# Patient Record
Sex: FEMALE | Race: WHITE | NOT HISPANIC OR LATINO | Employment: STUDENT | URBAN - NONMETROPOLITAN AREA
[De-identification: names, ages, dates, MRNs, and addresses within clinical notes are randomized per-mention and may not be internally consistent; named-entity substitution may affect disease eponyms.]

---
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
## 2022-01-06 RX ORDER — LURASIDONE HYDROCHLORIDE 60 MG/1
60 TABLET, FILM COATED ORAL DAILY
COMMUNITY

## 2022-01-06 RX ORDER — LITHIUM CARBONATE 150 MG/1
2 CAPSULE ORAL EVERY 8 HOURS
COMMUNITY

## 2022-01-06 RX ORDER — ESCITALOPRAM OXALATE 10 MG/1
20 TABLET ORAL DAILY
COMMUNITY
Start: 2020-02-04

## 2022-01-06 RX ORDER — NORETHINDRONE ACETATE AND ETHINYL ESTRADIOL 1MG-20(21)
1 KIT ORAL DAILY
COMMUNITY
Start: 2020-02-04 | End: 2022-02-10

## 2022-01-06 RX ORDER — PROPRANOLOL HYDROCHLORIDE 20 MG/1
20 TABLET ORAL EVERY 12 HOURS
COMMUNITY

## 2022-02-10 ENCOUNTER — OFFICE VISIT (OUTPATIENT)
Dept: BARIATRICS/WEIGHT MGMT | Facility: CLINIC | Age: 22
End: 2022-02-10

## 2022-02-10 ENCOUNTER — DOCUMENTATION (OUTPATIENT)
Dept: BARIATRICS/WEIGHT MGMT | Facility: CLINIC | Age: 22
End: 2022-02-10

## 2022-02-10 VITALS
OXYGEN SATURATION: 98 % | HEART RATE: 107 BPM | DIASTOLIC BLOOD PRESSURE: 68 MMHG | WEIGHT: 287.5 LBS | BODY MASS INDEX: 46.21 KG/M2 | SYSTOLIC BLOOD PRESSURE: 118 MMHG | RESPIRATION RATE: 18 BRPM | TEMPERATURE: 96.8 F | HEIGHT: 66 IN

## 2022-02-10 DIAGNOSIS — E28.2 PCOS (POLYCYSTIC OVARIAN SYNDROME): ICD-10-CM

## 2022-02-10 DIAGNOSIS — R13.10 DYSPHAGIA, UNSPECIFIED TYPE: ICD-10-CM

## 2022-02-10 DIAGNOSIS — E66.01 MORBID OBESITY: Primary | ICD-10-CM

## 2022-02-10 DIAGNOSIS — R53.83 FATIGUE, UNSPECIFIED TYPE: ICD-10-CM

## 2022-02-10 DIAGNOSIS — R10.13 DYSPEPSIA: ICD-10-CM

## 2022-02-10 PROCEDURE — 99204 OFFICE O/P NEW MOD 45 MIN: CPT | Performed by: PHYSICIAN ASSISTANT

## 2022-02-10 NOTE — PROGRESS NOTES
[facility] GROUP BARIATRIC SURGERY  [address]  [address]  [facility] [id number] [phone]      Patient  Name:  Heather Damico  :  2000      Date of Visit: 02/10/2022      Chief Complaint:  weight gain; unable to maintain weight loss      History of Present Illness:  Heather Damico is a 21 y.o. female who presents today for evaluation, education and consultation regarding metabolic and bariatric surgery with Dr. Kraft.    s/p RIO w/ Dr. gAuilar 2020.  Has struggled w/ wt.loss.  Band vol unknown but says adjustments have been difficult d/t port positioning issues.      Presurgery weight:  310 lbs.  Lowest weight:  279 lbs.  Current weight:  287 lbs.      Has episodic dysphagia, worse w/ breads.  Occ port pain but denies redness/irritation.  No reflux/heartburn/N/V.  No fevers/chills.    Feels like she needs to go in a different direction.  Mom had an lapband that subsequently eroded, removed by Dr. Aguilar.  Dad had LSG w/ [facility].     Complete history has been obtained and discussed today, as pertinent to metabolic/ bariatric surgery.     Past Medical History:   Diagnosis Date   • Anxiety    • Bipolar disorder (HCC)     follows w/ therapist 2x/month   • Depression    • Dyspepsia    • Dyspnea on exertion    • Fatigue    • Morbid obesity (HCC)    • PCOS (polycystic ovarian syndrome)      Past Surgical History:   Procedure Laterality Date   • LAPAROSCOPIC GASTRIC BANDING      w/ Dr. Aguilar       No Known Allergies    Current Outpatient Medications:   •  escitalopram (Lexapro) 10 MG tablet, Take 20 mg by mouth Daily., Disp: , Rfl:   •  lithium carbonate 150 MG capsule, Take 2 capsules by mouth Every 8 (Eight) Hours., Disp: , Rfl:   •  lurasidone HCl (Latuda) 60 MG tablet tablet, Take 60 mg by mouth Daily., Disp: , Rfl:   •  propranolol (INDERAL) 20 MG tablet, Take 20 mg by mouth Every 12 (Twelve) Hours., Disp: , Rfl:   •  Sprintec 28 0.25-35 MG-MCG per tablet, , Disp: , Rfl:      Social History     Socioeconomic History   • Marital status: Single   Tobacco Use   • Smoking status: Never Smoker   • Smokeless tobacco: Never Used   Substance and Sexual Activity   • Alcohol use: Never   • Drug use: Never     Social History     Social History Narrative    Lives in [place], [place] w/ her parents/brother.  Student @ [employer].         Family History   Problem Relation Age of Onset   • Obesity Mother    • Obesity Father    • No Known Problems Maternal Grandmother    • No Known Problems Paternal Grandmother    • No Known Problems Paternal Grandfather        Review of Systems:  Constitutional:  reports fatigue, weight gain and denies fevers, chills.  HEENT:  denies headache, ear pain or loss of hearing, blurred or double vision, nasal discharge or sore throat.  Cardiovascular:  denies HTN, hx heart disease, palpitations, hx DVT.  Respiratory:  denies cough , wheezing, sleep apnea, asthma, hx PE.  Gastrointestinal:  denies nausea, vomiting, IBS, gallbladder issues, liver disease.  Genitourinary:  denies history of  frequent UTI, incontinence, hematuria, dysuria, polyuria, polydipsia, renal insufficiency.    Musculoskeletal:  denies joint pain, fibromyalgia, arthritis and autoimmune disease.  Neurological:  denies numbness /tingling, dizziness, confusion, seizure.  Psychiatric:  reports hx depression, hx anxiety, bipolar disorder.  Endocrine:  reports PCOS and denies glucose intolerance, diabetes, thyroid disease.  Hematologic:  denies bruising, bleeding disorder, hx anemia, hx blood transfusion.  Skin:  denies rashes, hx MRSA.      COVID-19 Questionnaire:    1.  Have you previously been tested for COVID-19?    []  No  [x]  Yes  2.  Were you ever positive for COVID-19?    [x]  No  []  Yes  3.  Are you employed in a healthcare setting?    [x]  No  []  Yes  4.  Are you symptomatic for COVID-19 as defined by the CDC (fever, cough)?  If so, when did symptoms begin?    [x]  No  []  Yes, symptoms began **  5.  Have  you been hospitalized for COVID-19?  If so, were you in the ICU?  [x]  No  []  Yes, but not in the ICU  []  Yes, and I was in the ICU  6.  Are you a resident in a congregate (group care setting?)    [x]  No  []  Yes  7.  Are you pregnant?  [x]  No  []  Yes      Physical Exam:  Vital Signs:  Weight: 130 kg (287 lb 8 oz)   Body mass index is 46.4 kg/m².  Temp: 96.8 °F (36 °C)   Heart Rate: 107   BP: 118/68     Physical Exam  Vitals reviewed.   Constitutional:       Appearance: She is well-developed.      Comments: wearing a mask   HENT:      Head: Normocephalic and atraumatic.   Eyes:      General: No scleral icterus.     Conjunctiva/sclera: Conjunctivae normal.   Neck:      Thyroid: No thyromegaly.   Cardiovascular:      Rate and Rhythm: Regular rhythm. Tachycardia present.      Heart sounds: No murmur heard.      Pulmonary:      Effort: Pulmonary effort is normal. No respiratory distress.      Breath sounds: Normal breath sounds. No wheezing or rales.   Abdominal:      General: Bowel sounds are normal. There is no distension.      Palpations: Abdomen is soft. There is no mass.      Tenderness: There is no abdominal tenderness.      Hernia: No hernia is present.      Comments: scars:  AGB w/ RUQ port - site unremarkable   Musculoskeletal:         General: Normal range of motion.      Cervical back: Neck supple.   Skin:     General: Skin is warm and dry.      Findings: No rash.   Neurological:      Mental Status: She is alert and oriented to person, place, and time.      Gait: Gait normal.   Psychiatric:         Judgment: Judgment normal.         Patient Active Problem List   Diagnosis   • Depression   • Anxiety   • Dyspepsia   • Dyspnea on exertion   • Fatigue   • Morbid obesity (HCC)   • Bipolar disorder (HCC)   • PCOS (polycystic ovarian syndrome)       Assessment:  21 y.o. female with medically complicated obesity pursuing sleeve gastrectomy.    Metabolic & Bariatric Surgery is deemed medically necessary given the  following: Patient's Body mass index is 46.4 kg/m². indicating that she is obese (BMI >30). Obesity-related health conditions include the following: PCOS. Obesity is worsening. BMI is is above average; BMI management plan is completed. We discussed consulting a Bariatric surgeon.        Plan:  Further evaluation will include: CBC, CMP, Lipids, TSH, HgA1C, H.Pylori UBT, EKG, CXR, UGI and EGD.    No additional clearances needed prior to surgery at this time.     Patient understands that bariatric surgery is not cosmetic surgery but rather a tool to help make a lifelong commitment to lifestyle changes including diet, exercise and behavior modifications.  The patient has been educated today on those expected postoperative lifestyle changes.  Psychological and Nutritional consultations will be completed prior to surgery.  Instructions on how to access [employer] (an internet based site w/ educational surgical videos) were given to the patient.  Recommended perioperative vitamin supplementation was reviewed.  The importance of avoiding ASA/ NSAIDS/ steroids/ tobacco/nicotine/ hormones/ immunomodulators perioperatively was discussed in detail.  All questions/concerns have been addressed.      Further input to follow pending the above.           VLAD Shore

## 2022-02-10 NOTE — PROGRESS NOTES
"Weight Loss Surgery  Presurgical Nutrition Assessment     Heather Damico  02/10/2022  [id number]  [id number]  2000  female    Surgery desired: Sleeve Gastrectomy - desires LSG after removal of AGB placed in 2020    Height: 167.6 cm (66\")  Weight: 130 kg (287.5 #)  BMI: 46.4    Past Medical History:   Diagnosis Date   • Anxiety    • Bipolar disorder (HCC)     follows w/ therapist 2x/month   • Depression    • Dyspepsia    • Dyspnea on exertion    • Fatigue    • Morbid obesity (HCC)    • PCOS (polycystic ovarian syndrome)      Past Surgical History:   Procedure Laterality Date   • LAPAROSCOPIC GASTRIC BANDING  2020    w/ Dr. Aguilar     No Known Allergies    Current Outpatient Medications:   •  escitalopram (Lexapro) 10 MG tablet, Take 20 mg by mouth Daily., Disp: , Rfl:   •  lithium carbonate 150 MG capsule, Take 2 capsules by mouth Every 8 (Eight) Hours., Disp: , Rfl:   •  lurasidone HCl (Latuda) 60 MG tablet tablet, Take 60 mg by mouth Daily., Disp: , Rfl:   •  propranolol (INDERAL) 20 MG tablet, Take 20 mg by mouth Every 12 (Twelve) Hours., Disp: , Rfl:   •  Sprintec 28 0.25-35 MG-MCG per tablet, , Disp: , Rfl:       Nutrition Assessment    Estimated energy needs:  2090 kcal    Estimated calories for weight loss:  1500 kcal    IBW (Pounds):  155 #        Excess body weight (Pounds):  135 #       Nutrition Recall  24 Hour recall: (B) (L) (D) -  Reviewed and discussed with patient.  Note that patient has gained ~55 # in the past 5 months, which she attributes to to dysphagia and depression during that time.  First eating episode at 1 pm = 1 banana & a 12 oz can of diet Coke.  Lunch @ 3 pm = 4 oz chicken tenders.  Dinner @ 5 pm = Chick-kasey-a chicken with 24 oz flavored water.  Snack @ 9:30 pm= 1 can chicken noodle soup /c a 12 oz can of diet soda.  Diet is deficient in protein and total calories and is devoid of fruits and vegetables.  Patient advised to focus on ingestion of adequate protein for successful weight " loss in 3 regular balanced meals + 2 to 3 high protein snacks per day.  She will wean self off of all diet soda and any other artificially or naturally sweetened beverages.       Exercise  never      Education    Provided information packet re:  Sleeve Gastrectomy  1. Reviewed guidelines for higher protein, limited carbohydrate diet to promote weight loss.  Encouraged patient to incorporate these principles of healthy eating from now until approximately 2 weeks prior to bariatric surgery date, when an even lower carbohydrate “liver-shrinking” regimen will be followed. (Information sheet re pre-op diet given).  Explained that after recovery from surgery this diet will again be followed to ensure further loss and for weight maintenance.    2. Encouraged patient to choose an acceptable protein supplement powder or shake for post-surgery liquid diet.  Provided product guidelines and examples.    3. Explained importance of goal setting to help in changing eating behaviors that are not conducive to weight loss.  Targeted several on a worksheet which also included spaces for patient to work on issues specific to them.  4. Provided follow-up options for support, including contact information for dietitians here, if desired.  Web-based support information and apps for smart phones and computers given.  Noted that monthly support group is offered at this clinic, and that support is associated with successful weight loss.    Recommend that team proceed with surgery and follow per protocol.      Nutrition Goals   Dietary Guidelines per information packet as described above  Protein goal:  grams per day   Carbohydrate goal:  100-140 grams per day  Eliminate soda, sweet tea, etc.     Exercise Goals  Continue current exercise routine   Add 15-30 minutes of activity per day as tolerated      Vane Reynoso, ELMA  02/10/2022  13:04 EST

## 2022-02-11 LAB
ALBUMIN SERPL-MCNC: 3.8 G/DL (ref 3.9–5)
ALBUMIN/GLOB SERPL: 1.1 {RATIO} (ref 1.2–2.2)
ALP SERPL-CCNC: 112 IU/L (ref 42–106)
ALT SERPL-CCNC: 14 IU/L (ref 0–32)
AST SERPL-CCNC: 18 IU/L (ref 0–40)
BASOPHILS # BLD AUTO: 0 X10E3/UL (ref 0–0.2)
BASOPHILS NFR BLD AUTO: 0 %
BILIRUB SERPL-MCNC: 0.2 MG/DL (ref 0–1.2)
BUN SERPL-MCNC: 7 MG/DL (ref 6–20)
BUN/CREAT SERPL: 9 (ref 9–23)
CALCIUM SERPL-MCNC: 9.8 MG/DL (ref 8.7–10.2)
CHLORIDE SERPL-SCNC: 102 MMOL/L (ref 96–106)
CHOLEST SERPL-MCNC: 165 MG/DL (ref 100–169)
CO2 SERPL-SCNC: 23 MMOL/L (ref 20–29)
CREAT SERPL-MCNC: 0.74 MG/DL (ref 0.57–1)
EOSINOPHIL # BLD AUTO: 0.1 X10E3/UL (ref 0–0.4)
EOSINOPHIL NFR BLD AUTO: 1 %
ERYTHROCYTE [DISTWIDTH] IN BLOOD BY AUTOMATED COUNT: 14.2 % (ref 11.7–15.4)
GLOBULIN SER CALC-MCNC: 3.5 G/DL (ref 1.5–4.5)
GLUCOSE SERPL-MCNC: 72 MG/DL (ref 65–99)
HBA1C MFR BLD: 5.2 % (ref 4.8–5.6)
HCT VFR BLD AUTO: 42.3 % (ref 34–46.6)
HDLC SERPL-MCNC: 42 MG/DL
HGB BLD-MCNC: 13.9 G/DL (ref 11.1–15.9)
IMM GRANULOCYTES # BLD AUTO: 0 X10E3/UL (ref 0–0.1)
IMM GRANULOCYTES NFR BLD AUTO: 0 %
LDLC SERPL CALC-MCNC: 87 MG/DL (ref 0–109)
LYMPHOCYTES # BLD AUTO: 2.6 X10E3/UL (ref 0.7–3.1)
LYMPHOCYTES NFR BLD AUTO: 22 %
MCH RBC QN AUTO: 26.9 PG (ref 26.6–33)
MCHC RBC AUTO-ENTMCNC: 32.9 G/DL (ref 31.5–35.7)
MCV RBC AUTO: 82 FL (ref 79–97)
MONOCYTES # BLD AUTO: 0.7 X10E3/UL (ref 0.1–0.9)
MONOCYTES NFR BLD AUTO: 6 %
NEUTROPHILS # BLD AUTO: 8.4 X10E3/UL (ref 1.4–7)
NEUTROPHILS NFR BLD AUTO: 71 %
PLATELET # BLD AUTO: 380 X10E3/UL (ref 150–450)
POTASSIUM SERPL-SCNC: 4.8 MMOL/L (ref 3.5–5.2)
PROT SERPL-MCNC: 7.3 G/DL (ref 6–8.5)
RBC # BLD AUTO: 5.16 X10E6/UL (ref 3.77–5.28)
SODIUM SERPL-SCNC: 141 MMOL/L (ref 134–144)
TRIGL SERPL-MCNC: 215 MG/DL (ref 0–89)
TSH SERPL DL<=0.005 MIU/L-ACNC: 1.99 UIU/ML (ref 0.45–4.5)
UREA BREATH TEST QL: NEGATIVE
VLDLC SERPL CALC-MCNC: 36 MG/DL (ref 5–40)
WBC # BLD AUTO: 11.9 X10E3/UL (ref 3.4–10.8)

## 2022-02-13 ENCOUNTER — PATIENT ROUNDING (BHMG ONLY) (OUTPATIENT)
Dept: BARIATRICS/WEIGHT MGMT | Facility: CLINIC | Age: 22
End: 2022-02-13

## 2022-02-13 NOTE — PROGRESS NOTES
A frestyl message has been sent to the patient for PATIENT ROUNDING for [facility].

## 2022-02-14 ENCOUNTER — APPOINTMENT (OUTPATIENT)
Dept: LAB | Facility: HOSPITAL | Age: 22
End: 2022-02-14

## 2022-02-16 ENCOUNTER — APPOINTMENT (OUTPATIENT)
Dept: GENERAL RADIOLOGY | Facility: HOSPITAL | Age: 22
End: 2022-02-16

## 2022-03-14 ENCOUNTER — APPOINTMENT (OUTPATIENT)
Dept: LAB | Facility: HOSPITAL | Age: 22
End: 2022-03-14

## 2022-03-16 ENCOUNTER — APPOINTMENT (OUTPATIENT)
Dept: GENERAL RADIOLOGY | Facility: HOSPITAL | Age: 22
End: 2022-03-16

## 2022-03-21 ENCOUNTER — APPOINTMENT (OUTPATIENT)
Dept: LAB | Facility: HOSPITAL | Age: 22
End: 2022-03-21

## 2022-03-23 ENCOUNTER — APPOINTMENT (OUTPATIENT)
Dept: GENERAL RADIOLOGY | Facility: HOSPITAL | Age: 22
End: 2022-03-23

## 2022-03-30 ENCOUNTER — LAB (OUTPATIENT)
Dept: LAB | Facility: HOSPITAL | Age: 22
End: 2022-03-30

## 2022-03-30 LAB — SARS-COV-2 RNA PNL SPEC NAA+PROBE: NOT DETECTED

## 2022-03-30 PROCEDURE — 87635 SARS-COV-2 COVID-19 AMP PRB: CPT | Performed by: PHYSICIAN ASSISTANT

## 2022-03-30 PROCEDURE — C9803 HOPD COVID-19 SPEC COLLECT: HCPCS | Performed by: PHYSICIAN ASSISTANT

## 2022-04-01 ENCOUNTER — HOSPITAL ENCOUNTER (OUTPATIENT)
Dept: GENERAL RADIOLOGY | Facility: HOSPITAL | Age: 22
Discharge: HOME | End: 2022-04-01
Admitting: PHYSICIAN ASSISTANT

## 2022-04-01 DIAGNOSIS — R10.13 DYSPEPSIA: ICD-10-CM

## 2022-04-01 PROCEDURE — 74240 X-RAY XM UPR GI TRC 1CNTRST: CPT

## 2022-06-03 ENCOUNTER — APPOINTMENT (OUTPATIENT)
Dept: LAB | Facility: HOSPITAL | Age: 22
End: 2022-06-03

## 2022-08-03 ENCOUNTER — DOCUMENTATION (OUTPATIENT)
Dept: PHYSICAL THERAPY | Facility: CLINIC | Age: 22
End: 2022-08-03

## 2022-12-01 PROCEDURE — 99283 EMERGENCY DEPT VISIT LOW MDM: CPT

## 2022-12-02 ENCOUNTER — HOSPITAL ENCOUNTER (EMERGENCY)
Facility: HOSPITAL | Age: 22
Discharge: HOME | End: 2022-12-02
Attending: EMERGENCY MEDICINE | Admitting: EMERGENCY MEDICINE

## 2022-12-02 VITALS
WEIGHT: 280 LBS | RESPIRATION RATE: 18 BRPM | TEMPERATURE: 98 F | DIASTOLIC BLOOD PRESSURE: 96 MMHG | BODY MASS INDEX: 45 KG/M2 | HEART RATE: 108 BPM | SYSTOLIC BLOOD PRESSURE: 162 MMHG | OXYGEN SATURATION: 99 % | HEIGHT: 66 IN

## 2022-12-02 DIAGNOSIS — L03.116 CELLULITIS OF LEFT LOWER EXTREMITY: Primary | ICD-10-CM

## 2022-12-02 PROCEDURE — 63710000001 PREDNISONE PER 1 MG: Performed by: EMERGENCY MEDICINE

## 2022-12-02 PROCEDURE — 63710000001 DIPHENHYDRAMINE PER 50 MG: Performed by: EMERGENCY MEDICINE

## 2022-12-02 RX ORDER — PREDNISONE 20 MG/1
TABLET ORAL
Qty: 10 TABLET | Refills: 0 | Status: SHIPPED | OUTPATIENT
Start: 2022-12-02

## 2022-12-02 RX ORDER — PREDNISONE 20 MG/1
40 TABLET ORAL ONCE
Status: COMPLETED | OUTPATIENT
Start: 2022-12-02 | End: 2022-12-02

## 2022-12-02 RX ORDER — CEPHALEXIN 250 MG/1
500 CAPSULE ORAL ONCE
Status: COMPLETED | OUTPATIENT
Start: 2022-12-02 | End: 2022-12-02

## 2022-12-02 RX ORDER — CEPHALEXIN 500 MG/1
500 CAPSULE ORAL 2 TIMES DAILY
Qty: 14 CAPSULE | Refills: 0 | Status: SHIPPED | OUTPATIENT
Start: 2022-12-02

## 2022-12-02 RX ORDER — DIPHENHYDRAMINE HCL 25 MG
25 CAPSULE ORAL ONCE
Status: COMPLETED | OUTPATIENT
Start: 2022-12-02 | End: 2022-12-02

## 2022-12-02 RX ADMIN — DIPHENHYDRAMINE HYDROCHLORIDE 25 MG: 25 CAPSULE ORAL at 03:30

## 2022-12-02 RX ADMIN — CEPHALEXIN 500 MG: 250 CAPSULE ORAL at 03:30

## 2022-12-02 RX ADMIN — PREDNISONE 40 MG: 20 TABLET ORAL at 03:30

## 2022-12-02 NOTE — ED PROVIDER NOTES
"Subjective  History of Present Illness:    Chief Complaint: Left calf pain  History of Present Illness: 22-year-old female presents with left calf pain burning sensation back of the left calf began 30 minutes prior  Onset: Today 30 minutes prior  Duration: Persistent  Exacerbating / Alleviating factors: Worse to touch, no alleviating factors  Associated symptoms: None      Nurses Notes reviewed and agree, including vitals, allergies, social history and prior medical history.     REVIEW OF SYSTEMS: All systems reviewed and not pertinent unless noted.    Positive for: Left calf pain and burning sensation    Negative for: Shortness of breath hemoptysis syncope palpitations edema    Past Medical History:   Diagnosis Date   • Anxiety    • Bipolar disorder (HCC)     follows w/ therapist 2x/month   • Depression    • Dyspepsia    • Dyspnea on exertion    • Fatigue    • Morbid obesity (HCC)    • PCOS (polycystic ovarian syndrome)        Allergies:    Patient has no known allergies.      Past Surgical History:   Procedure Laterality Date   • LAPAROSCOPIC GASTRIC BANDING  2020    w/ Dr. Aguilar         Social History     Socioeconomic History   • Marital status: Single   Tobacco Use   • Smoking status: Never   • Smokeless tobacco: Never   Substance and Sexual Activity   • Alcohol use: Never   • Drug use: Never         Family History   Problem Relation Age of Onset   • Obesity Mother    • Obesity Father    • No Known Problems Maternal Grandmother    • No Known Problems Paternal Grandmother    • No Known Problems Paternal Grandfather        Objective  Physical Exam:  /96 (BP Location: Left arm, Patient Position: Sitting)   Pulse (!) 126   Temp 98 °F (36.7 °C) (Oral)   Resp 19   Ht 167.6 cm (66\")   Wt 127 kg (280 lb)   SpO2 99%   BMI 45.19 kg/m²    CONSTITUTIONAL: Well developed, nontoxic obese 22-year-old female,  in no acute distress.  VITAL SIGNS: per nursing, reviewed and noted  SKIN: exposed skin with " well-demarcated 8 x 5 cm area of erythema the back of the left calf.  No puncture  EYES: Grossly EOMI, no icterus  ENT: Normal voice.  Patient maintained wearing a mask throughout patient encounter due to coronavirus pandemic  RESPIRATORY:  No increased work of breathing. No retractions.   CARDIOVASCULAR:  Good Peripheral pulses. Good cap refill to extremities.   MUSCULOSKELETAL: Tenderness to forementioned area of cellulitis/erythema on the left calf.  Age-appropriate bulk and tone moves all 4 extremities   NEUROLOGIC: Alert, oriented x 3. No gross deficits. GCS 15.   PSYCH: appropriate affect.    Procedures  Bedside ultrasound  Indications left calf cellulitis/local inflammatory reaction  Findings no phlebitis no DVT no abscess    ED Course:         Lab Results (last 24 hours)     ** No results found for the last 24 hours. **           No radiology results from the last 24 hrs       [facility]  03:23 EST  Patient presented for evaluation of above complaint.  Exam suggestive of a local inflammatory reaction with cellulitis.  Ultrasound without acute findings.  Discharged in stable condition with Keflex prednisone and Benadryl.  Patient was discharged in home stable condition.  Counseled on supportive care, outpatient follow-up. Return precaution discussed.  Patient/family was understanding and agreeable with plan      Final diagnoses:   None        Al Yung, DO  12/02/22 0323